# Patient Record
Sex: MALE | Race: WHITE | NOT HISPANIC OR LATINO | Employment: FULL TIME | URBAN - METROPOLITAN AREA
[De-identification: names, ages, dates, MRNs, and addresses within clinical notes are randomized per-mention and may not be internally consistent; named-entity substitution may affect disease eponyms.]

---

## 2024-07-12 ENCOUNTER — OFFICE VISIT (OUTPATIENT)
Age: 34
End: 2024-07-12
Payer: COMMERCIAL

## 2024-07-12 ENCOUNTER — LAB (OUTPATIENT)
Age: 34
End: 2024-07-12
Payer: COMMERCIAL

## 2024-07-12 VITALS
SYSTOLIC BLOOD PRESSURE: 112 MMHG | DIASTOLIC BLOOD PRESSURE: 80 MMHG | HEART RATE: 84 BPM | OXYGEN SATURATION: 98 % | WEIGHT: 191 LBS

## 2024-07-12 DIAGNOSIS — K62.5 BRBPR (BRIGHT RED BLOOD PER RECTUM): Primary | ICD-10-CM

## 2024-07-12 DIAGNOSIS — K59.00 CONSTIPATION, UNSPECIFIED CONSTIPATION TYPE: ICD-10-CM

## 2024-07-12 DIAGNOSIS — K62.5 BRBPR (BRIGHT RED BLOOD PER RECTUM): ICD-10-CM

## 2024-07-12 DIAGNOSIS — K62.5 HEMORRHAGE OF RECTUM AND ANUS: ICD-10-CM

## 2024-07-12 DIAGNOSIS — K59.00 CONSTIPATION, UNSPECIFIED CONSTIPATION TYPE: Primary | ICD-10-CM

## 2024-07-12 LAB
BASOPHILS # BLD AUTO: 0.02 10*3/MM3 (ref 0–0.2)
BASOPHILS NFR BLD AUTO: 0.4 % (ref 0–1.5)
DEPRECATED RDW RBC AUTO: 42.7 FL (ref 37–54)
EOSINOPHIL # BLD AUTO: 0.06 10*3/MM3 (ref 0–0.4)
EOSINOPHIL NFR BLD AUTO: 1.2 % (ref 0.3–6.2)
ERYTHROCYTE [DISTWIDTH] IN BLOOD BY AUTOMATED COUNT: 13 % (ref 12.3–15.4)
ERYTHROCYTE [SEDIMENTATION RATE] IN BLOOD: 3 MM/HR (ref 0–15)
HCT VFR BLD AUTO: 45.9 % (ref 37.5–51)
HGB BLD-MCNC: 15.9 G/DL (ref 13–17.7)
IMM GRANULOCYTES # BLD AUTO: 0.01 10*3/MM3 (ref 0–0.05)
IMM GRANULOCYTES NFR BLD AUTO: 0.2 % (ref 0–0.5)
LYMPHOCYTES # BLD AUTO: 1.93 10*3/MM3 (ref 0.7–3.1)
LYMPHOCYTES NFR BLD AUTO: 38.2 % (ref 19.6–45.3)
MCH RBC QN AUTO: 31.7 PG (ref 26.6–33)
MCHC RBC AUTO-ENTMCNC: 34.6 G/DL (ref 31.5–35.7)
MCV RBC AUTO: 91.6 FL (ref 79–97)
MONOCYTES # BLD AUTO: 0.6 10*3/MM3 (ref 0.1–0.9)
MONOCYTES NFR BLD AUTO: 11.9 % (ref 5–12)
NEUTROPHILS NFR BLD AUTO: 2.43 10*3/MM3 (ref 1.7–7)
NEUTROPHILS NFR BLD AUTO: 48.1 % (ref 42.7–76)
NRBC BLD AUTO-RTO: 0 /100 WBC (ref 0–0.2)
PLATELET # BLD AUTO: 224 10*3/MM3 (ref 140–450)
PMV BLD AUTO: 10.3 FL (ref 6–12)
RBC # BLD AUTO: 5.01 10*6/MM3 (ref 4.14–5.8)
WBC NRBC COR # BLD AUTO: 5.05 10*3/MM3 (ref 3.4–10.8)

## 2024-07-12 PROCEDURE — 83540 ASSAY OF IRON: CPT | Performed by: INTERNAL MEDICINE

## 2024-07-12 PROCEDURE — 86140 C-REACTIVE PROTEIN: CPT | Performed by: INTERNAL MEDICINE

## 2024-07-12 PROCEDURE — 80050 GENERAL HEALTH PANEL: CPT | Performed by: INTERNAL MEDICINE

## 2024-07-12 PROCEDURE — 85652 RBC SED RATE AUTOMATED: CPT | Performed by: INTERNAL MEDICINE

## 2024-07-12 PROCEDURE — 36415 COLL VENOUS BLD VENIPUNCTURE: CPT | Performed by: INTERNAL MEDICINE

## 2024-07-12 PROCEDURE — 84466 ASSAY OF TRANSFERRIN: CPT | Performed by: INTERNAL MEDICINE

## 2024-07-12 PROCEDURE — 82728 ASSAY OF FERRITIN: CPT | Performed by: INTERNAL MEDICINE

## 2024-07-12 PROCEDURE — 99204 OFFICE O/P NEW MOD 45 MIN: CPT | Performed by: INTERNAL MEDICINE

## 2024-07-12 PROCEDURE — 84439 ASSAY OF FREE THYROXINE: CPT | Performed by: INTERNAL MEDICINE

## 2024-07-12 NOTE — PROGRESS NOTES
New Patient Note    Hiram Barnes is a 33 y.o. male.    Chief Complaint   Patient presents with    Establish Care    Black or Bloody Stool    Bloated       HPI    BRBPR  Constipation  - saw a GI specialist at [place] before moving, Feb or March  - they did not recommend repeating scope  - he asked about his anxiety  - he talked about pelvic muscle floor strengthening  - drink more water and exercise  - still has blood with wiping, now blood mixed in stool, some in water    - a lot of constipation in recent months  - goes ev 2-3 days most often sometimes up to 5 days  - long log and curved, looks smooth to bumpy  - he has started metamucil  - he feels full and bloated  - after using the restroom does not feel relief  - have a hard time cleaning self, takes a long time, keeps coming    - on and off for years  - had a colonoscopy in 2011 when he was 21 due to blood in stool, he thinks that they found internal hemorrhoids  - during 2011 BRBPR from wiping, on stool but not in toilet    - no family history of Crohns, UC, or colon cancer    Work Stress  - GI mentioned possibility of anxiety  - he is not sure that is a thing  - he does have high stress position but feels like deals with it ok    Past Medical History:  There is no problem list on file for this patient.       Medications:  No current outpatient medications on file.     Allergy to Medications:  Allergies   Allergen Reactions    Amoxicillin Unknown - High Severity        Immunizations:    There is no immunization history on file for this patient.    Surgeries:  History reviewed. No pertinent surgical history.     Family History:  Family History   Problem Relation Age of Onset    Prostate cancer Other         uncle    Colon cancer Neg Hx     Heart attack Neg Hx     Breast cancer Neg Hx     Stroke Neg Hx         Social:  Social History     Socioeconomic History    Marital status: Single   Tobacco Use    Smoking status: Never    Smokeless tobacco: Never    Vaping Use    Vaping status: Never Used   Substance and Sexual Activity    Alcohol use: Yes     Comment: social    Drug use: Never    Sexual activity: Yes     Partners: Female       Household:  Occupation:[employer]  Hobbies:  Exercise:      Objective   /80   Pulse 84   Wt 86.6 kg (191 lb)   SpO2 98%   BMI cannot be calculated due to outdated height or weight values.  Please input a current height/weight in Vitals and re-renter BMIFOLLOWUP in Note to pull in correct documentation based on BMI range.       Physical Exam  Vitals reviewed.   Constitutional:       General: He is not in acute distress.     Appearance: Normal appearance.   HENT:      Nose: Nose normal.      Mouth/Throat:      Mouth: Mucous membranes are moist.   Eyes:      Conjunctiva/sclera: Conjunctivae normal.   Cardiovascular:      Rate and Rhythm: Normal rate and regular rhythm.      Heart sounds: Normal heart sounds. No murmur heard.  Pulmonary:      Effort: Pulmonary effort is normal. No respiratory distress.      Breath sounds: Normal breath sounds.   Abdominal:      General: Abdomen is flat. Bowel sounds are normal. There is no distension.      Palpations: Abdomen is soft.      Tenderness: There is no abdominal tenderness.   Skin:     General: Skin is warm and dry.   Neurological:      Mental Status: He is alert.   Psychiatric:         Mood and Affect: Mood normal.         Thought Content: Thought content normal.         Judgment: Judgment normal.         Assessment & Plan   1. BRBPR (bright red blood per rectum)  - known internal hemorrhoids from 2011 cscope  - ANGIE signed for cscope report  - no fam history of IBD or colon cancer  - will obtain labs to rule out some other causes and rule out secondary issues like anemia etc  - if treatment of constipation doesn't resolve BRBPR or labs concerning, will consider referral for scopes  - Iron Profile; Future  - Ferritin; Future  - CBC & Differential; Future  -  Comprehensive Metabolic Panel; Future  - Sedimentation Rate; Future  - C-reactive Protein; Future  - TSH+Free T4; Future    2. Constipation, unspecified constipation type  - will initiate a cleanout, given handout  - will start senna night before, enema on the day off, day 1, 4 caps and day 2, 4 caps of the 8.3oz bottle Miralax mixed in 64oz of Gatorade, not red/orange  - then continue senna for 2 weeks and 1-2 capful Miralax daily for maintenance  - Iron Profile; Future  - Ferritin; Future  - CBC & Differential; Future  - Comprehensive Metabolic Panel; Future  - Sedimentation Rate; Future  - C-reactive Protein; Future  - TSH+Free T4; Future    FU in 2 weeks       Health Care Maintenance:  HIV Screen:  Hep C Screen:  GC/Chlamydia Screen:    Colon Ca Screening: Start Screening at 46yo / Last Colonoscopy:  Result:  Mammogram: Start Screening at 41yo / Last mammogram:  Result:  Prostate Cancer Screening:  Lung Cancer: Positive smoking history screen at 51yo/Last result:    Immunizations:    DEXA:  AAA:    Lipids:  A1C:    Barbara Mayo MD, FAAP, FACP  Internal Medicine and Pediatrics  [facility]

## 2024-07-13 LAB
ALBUMIN SERPL-MCNC: 4.6 G/DL (ref 3.5–5.2)
ALBUMIN/GLOB SERPL: 2.2 G/DL
ALP SERPL-CCNC: 60 U/L (ref 39–117)
ALT SERPL W P-5'-P-CCNC: 55 U/L (ref 1–41)
ANION GAP SERPL CALCULATED.3IONS-SCNC: 7.3 MMOL/L (ref 5–15)
AST SERPL-CCNC: 36 U/L (ref 1–40)
BILIRUB SERPL-MCNC: 0.8 MG/DL (ref 0–1.2)
BUN SERPL-MCNC: 17 MG/DL (ref 6–20)
BUN/CREAT SERPL: 15 (ref 7–25)
CALCIUM SPEC-SCNC: 9.7 MG/DL (ref 8.6–10.5)
CHLORIDE SERPL-SCNC: 106 MMOL/L (ref 98–107)
CO2 SERPL-SCNC: 28.7 MMOL/L (ref 22–29)
CREAT SERPL-MCNC: 1.13 MG/DL (ref 0.76–1.27)
CRP SERPL-MCNC: 0.82 MG/DL (ref 0–0.5)
EGFRCR SERPLBLD CKD-EPI 2021: 88 ML/MIN/1.73
FERRITIN SERPL-MCNC: 319 NG/ML (ref 30–400)
GLOBULIN UR ELPH-MCNC: 2.1 GM/DL
GLUCOSE SERPL-MCNC: 101 MG/DL (ref 65–99)
IRON 24H UR-MRATE: 103 MCG/DL (ref 59–158)
IRON SATN MFR SERPL: 27 % (ref 20–50)
POTASSIUM SERPL-SCNC: 4.8 MMOL/L (ref 3.5–5.2)
PROT SERPL-MCNC: 6.7 G/DL (ref 6–8.5)
SODIUM SERPL-SCNC: 142 MMOL/L (ref 136–145)
T4 FREE SERPL-MCNC: 1.25 NG/DL (ref 0.92–1.68)
TIBC SERPL-MCNC: 384 MCG/DL (ref 298–536)
TRANSFERRIN SERPL-MCNC: 258 MG/DL (ref 200–360)
TSH SERPL DL<=0.05 MIU/L-ACNC: 1.79 UIU/ML (ref 0.27–4.2)

## 2024-07-22 ENCOUNTER — OFFICE VISIT (OUTPATIENT)
Age: 34
End: 2024-07-22
Payer: COMMERCIAL

## 2024-07-22 VITALS
SYSTOLIC BLOOD PRESSURE: 106 MMHG | HEIGHT: 73 IN | DIASTOLIC BLOOD PRESSURE: 70 MMHG | BODY MASS INDEX: 25.18 KG/M2 | OXYGEN SATURATION: 98 % | HEART RATE: 77 BPM | WEIGHT: 190 LBS

## 2024-07-22 DIAGNOSIS — K62.5 BRBPR (BRIGHT RED BLOOD PER RECTUM): Primary | ICD-10-CM

## 2024-07-22 DIAGNOSIS — R74.01 TRANSAMINITIS: ICD-10-CM

## 2024-07-22 DIAGNOSIS — K59.00 CONSTIPATION, UNSPECIFIED CONSTIPATION TYPE: ICD-10-CM

## 2024-07-22 PROCEDURE — 99213 OFFICE O/P EST LOW 20 MIN: CPT | Performed by: INTERNAL MEDICINE

## 2024-07-22 NOTE — PROGRESS NOTES
Progress Note    Subjective      Cameron is a 33 y.o. male.    Chief Complaint   Patient presents with    Rectal Bleeding     Pt stated he did not notice any more blood in stool        HPI  BRBPR  Constipation    Today:  Since completion of bowel cleanout, no fullness, no blood in stool  Daily soft cow terrell stool once a day  Taking 2 senna and Miralax 1 cap daily    Last Visit:  - saw a GI specialist at [place] before moving, Feb or March  - they did not recommend repeating scope  - he asked about his anxiety  - he talked about pelvic muscle floor strengthening  - drink more water and exercise  - still has blood with wiping, now blood mixed in stool, some in water     - a lot of constipation in recent months  - goes ev 2-3 days most often sometimes up to 5 days  - long log and curved, looks smooth to bumpy  - he has started metamucil  - he feels full and bloated  - after using the restroom does not feel relief  - have a hard time cleaning self, takes a long time, keeps coming     - on and off for years  - had a colonoscopy in 2011 when he was 21 due to blood in stool, he thinks that they found internal hemorrhoids  - during 2011 BRBPR from wiping, on stool but not in toilet     - no family history of Crohns, UC, or colon cancer  Past Medical History:  There is no problem list on file for this patient.      Medications:  No current outpatient medications on file prior to visit.     No current facility-administered medications on file prior to visit.       Allergies:   Allergies   Allergen Reactions    Amoxicillin Unknown - High Severity       Immunizations:    There is no immunization history on file for this patient.     Family History:  Family History   Problem Relation Age of Onset    Prostate cancer Other         uncle    Colon cancer Neg Hx     Heart attack Neg Hx     Breast cancer Neg Hx     Stroke Neg Hx        Social History:  Social History     Socioeconomic History    Marital status: Single   Tobacco Use     Smoking status: Never    Smokeless tobacco: Never   Vaping Use    Vaping status: Never Used   Substance and Sexual Activity    Alcohol use: Yes     Comment: social    Drug use: Never    Sexual activity: Yes     Partners: Female       Objective   /70   Pulse 77   Wt 86.2 kg (190 lb)   SpO2 98%     BMI cannot be calculated due to outdated height or weight values.  Please input a current height/weight in Vitals and re-renter BMIFOLLOWUP in Note to pull in correct documentation based on BMI range.       Physical Exam  Vitals reviewed.   Constitutional:       General: He is not in acute distress.  HENT:      Nose: Nose normal.      Mouth/Throat:      Mouth: Mucous membranes are moist.   Eyes:      Conjunctiva/sclera: Conjunctivae normal.   Cardiovascular:      Rate and Rhythm: Normal rate.   Pulmonary:      Effort: Pulmonary effort is normal.   Abdominal:      General: Abdomen is flat. Bowel sounds are normal. There is no distension.      Palpations: Abdomen is soft. There is no mass.      Tenderness: There is no abdominal tenderness. There is no guarding or rebound.   Neurological:      Mental Status: He is alert.   Psychiatric:         Mood and Affect: Mood normal.         Thought Content: Thought content normal.         Judgment: Judgment normal.         Assessment & Plan     1. BRBPR (bright red blood per rectum)  - known internal hemorrhoids from 2011 cscope  - ANGIE signed for cscope report from [place] last visit, have not received yet  - no fam history of IBD or colon cancer  - CBC, Iron studies, ESR, TSH/FT4 unremarkable last visit  - CMP with mildly elevated liver enzyme, will repeat in 3mo  - CRP slight elevation in the 0.8 range, ferritin and ESR normal  - constipation and blood in stool as well as fullness at anus now resolved  - advised to call if blood returns prior to follow up     2. Constipation, unspecified constipation type  - completed cleanout and now daily soft stool  - continue Senna  for 2 weeks from cleanout  - continue Miralax 1 cap daily and if needed can increase to 2 caps when Senna stopped    3. Transaminitis  - mild enzyme elevation  - pt is holding any occasional alcohol, supplements etc  - Mom and GM with a history of elevated liver enzymes but no known dx, GM is overweight and Mom is not  - will repeat CMP at 3mo FU     FU in 3mo    Barbara Mayo MD, FAAP, FACP  Internal Medicine and Pediatrics  [facility]